# Patient Record
Sex: FEMALE | Race: WHITE | NOT HISPANIC OR LATINO | Employment: UNEMPLOYED | URBAN - METROPOLITAN AREA
[De-identification: names, ages, dates, MRNs, and addresses within clinical notes are randomized per-mention and may not be internally consistent; named-entity substitution may affect disease eponyms.]

---

## 2017-03-27 DIAGNOSIS — Z12.31 OTHER SCREENING MAMMOGRAM: ICD-10-CM

## 2017-06-13 ENCOUNTER — PATIENT OUTREACH (OUTPATIENT)
Dept: ADMINISTRATIVE | Facility: HOSPITAL | Age: 57
End: 2017-06-13

## 2017-06-26 ENCOUNTER — OFFICE VISIT (OUTPATIENT)
Dept: PODIATRY | Facility: CLINIC | Age: 57
End: 2017-06-26
Payer: COMMERCIAL

## 2017-06-26 ENCOUNTER — HOSPITAL ENCOUNTER (OUTPATIENT)
Dept: RADIOLOGY | Facility: HOSPITAL | Age: 57
Discharge: HOME | End: 2017-06-26
Attending: FAMILY MEDICINE
Payer: COMMERCIAL

## 2017-06-26 ENCOUNTER — LAB VISIT (OUTPATIENT)
Dept: LAB | Facility: HOSPITAL | Age: 57
End: 2017-06-26
Attending: FAMILY MEDICINE
Payer: COMMERCIAL

## 2017-06-26 ENCOUNTER — OFFICE VISIT (OUTPATIENT)
Dept: FAMILY MEDICINE | Facility: CLINIC | Age: 57
End: 2017-06-26
Attending: FAMILY MEDICINE
Payer: COMMERCIAL

## 2017-06-26 VITALS
WEIGHT: 147.38 LBS | SYSTOLIC BLOOD PRESSURE: 117 MMHG | BODY MASS INDEX: 24.55 KG/M2 | HEART RATE: 118 BPM | HEIGHT: 65 IN | DIASTOLIC BLOOD PRESSURE: 78 MMHG

## 2017-06-26 VITALS
WEIGHT: 147.38 LBS | HEART RATE: 76 BPM | SYSTOLIC BLOOD PRESSURE: 120 MMHG | HEIGHT: 65 IN | RESPIRATION RATE: 16 BRPM | DIASTOLIC BLOOD PRESSURE: 80 MMHG | BODY MASS INDEX: 24.55 KG/M2

## 2017-06-26 DIAGNOSIS — Z12.31 OTHER SCREENING MAMMOGRAM: ICD-10-CM

## 2017-06-26 DIAGNOSIS — M19.079 ARTHRITIS OF FOOT: ICD-10-CM

## 2017-06-26 DIAGNOSIS — M21.41 PES PLANUS OF BOTH FEET: ICD-10-CM

## 2017-06-26 DIAGNOSIS — M25.552 LEFT HIP PAIN: Primary | ICD-10-CM

## 2017-06-26 DIAGNOSIS — E55.9 VITAMIN D DEFICIENCY: ICD-10-CM

## 2017-06-26 DIAGNOSIS — Q79.60 EHLERS-DANLOS SYNDROME: ICD-10-CM

## 2017-06-26 DIAGNOSIS — N64.4 BREAST PAIN: ICD-10-CM

## 2017-06-26 DIAGNOSIS — M21.42 PES PLANUS OF BOTH FEET: ICD-10-CM

## 2017-06-26 DIAGNOSIS — M20.10 HALLUX ABDUCTO VALGUS, UNSPECIFIED LATERALITY: Primary | ICD-10-CM

## 2017-06-26 DIAGNOSIS — M70.62 TROCHANTERIC BURSITIS OF LEFT HIP: ICD-10-CM

## 2017-06-26 LAB — 25(OH)D3+25(OH)D2 SERPL-MCNC: 40 NG/ML

## 2017-06-26 PROCEDURE — 20600 DRAIN/INJ JOINT/BURSA W/O US: CPT | Mod: 50,S$GLB,, | Performed by: PODIATRIST

## 2017-06-26 PROCEDURE — 36415 COLL VENOUS BLD VENIPUNCTURE: CPT | Mod: PO

## 2017-06-26 PROCEDURE — 99214 OFFICE O/P EST MOD 30 MIN: CPT | Mod: S$GLB,,, | Performed by: FAMILY MEDICINE

## 2017-06-26 PROCEDURE — 82306 VITAMIN D 25 HYDROXY: CPT

## 2017-06-26 PROCEDURE — 76642 ULTRASOUND BREAST LIMITED: CPT | Mod: TC,LT

## 2017-06-26 PROCEDURE — 99999 PR PBB SHADOW E&M-EST. PATIENT-LVL III: CPT | Mod: PBBFAC,,, | Performed by: FAMILY MEDICINE

## 2017-06-26 PROCEDURE — 76642 ULTRASOUND BREAST LIMITED: CPT | Mod: 26,LT,, | Performed by: RADIOLOGY

## 2017-06-26 PROCEDURE — 99203 OFFICE O/P NEW LOW 30 MIN: CPT | Mod: 25,S$GLB,, | Performed by: PODIATRIST

## 2017-06-26 PROCEDURE — 77062 BREAST TOMOSYNTHESIS BI: CPT | Mod: TC

## 2017-06-26 PROCEDURE — 77062 BREAST TOMOSYNTHESIS BI: CPT | Mod: 26,,, | Performed by: RADIOLOGY

## 2017-06-26 PROCEDURE — 77066 DX MAMMO INCL CAD BI: CPT | Mod: 26,,, | Performed by: RADIOLOGY

## 2017-06-26 PROCEDURE — 99999 PR PBB SHADOW E&M-EST. PATIENT-LVL IV: CPT | Mod: PBBFAC,,, | Performed by: PODIATRIST

## 2017-06-26 RX ORDER — LIDOCAINE 50 MG/G
1 PATCH TOPICAL DAILY
Qty: 30 PATCH | Refills: 3 | Status: SHIPPED | OUTPATIENT
Start: 2017-06-26

## 2017-06-26 RX ORDER — TRIAMCINOLONE ACETONIDE 40 MG/ML
40 INJECTION, SUSPENSION INTRA-ARTICULAR; INTRAMUSCULAR ONCE
Status: COMPLETED | OUTPATIENT
Start: 2017-06-26 | End: 2017-06-26

## 2017-06-26 RX ADMIN — TRIAMCINOLONE ACETONIDE 40 MG: 40 INJECTION, SUSPENSION INTRA-ARTICULAR; INTRAMUSCULAR at 09:06

## 2017-06-26 NOTE — MEDICAL/APP STUDENT
Subjective:       Patient ID: Sophy Pascual is a 56 y.o. female.    Chief Complaint: Hip Pain    HPI  Patient is complaining of a 2 month history of left sided hip. She denies any inciting event. She states the pain is a 7/10, radiates into the left buttocks, and is aching in nature. She states the pain comes and goes and is worse when she gets up in the mornings, going down stairs, and sitting/ laying down for long periods of time. Pain is improved with stretching. 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 She has a history of bursitis in both hips but states the pain doesn't feel similar.     Patient getting mammogram today. UTD on colonoscopy/pap smear.  Last Tetanus in 2009. Has not been screened for Hep C     Review of Systems   Constitutional: Negative for fever and unexpected weight change.   Eyes: Negative for photophobia and visual disturbance.   Gastrointestinal: Positive for abdominal pain (chronic).   Genitourinary: Negative for difficulty urinating and urgency.   Musculoskeletal: Positive for arthralgias, back pain, myalgias and neck pain.   Neurological: Positive for headaches. Negative for dizziness and light-headedness.   Psychiatric/Behavioral: Positive for sleep disturbance.       Patient Active Problem List   Diagnosis    Chronic pain syndrome    John-Danlos syndrome    Chronic back pain    John-Danlos disease - Both Eyes    Atypical nevus    Osteoarthritis of hand    Facet joint disease of cervical region    Occipital neuralgia    Chronic migraine without aura, with intractable migraine, so stated, without mention of status migrainosus    Gastroesophageal reflux disease without esophagitis    Uterine prolapse Grade 2    Cystocele Grade 1    Rectocele Grade 2    Vaginal atrophy    Menopausal symptoms    Post-void dribbling    Constipation    BENJY (stress urinary incontinence, female)    Rectal bleeding      Current Outpatient Prescriptions on File Prior to Visit   Medication Sig Dispense Refill    baclofen (LIORESAL) 20 MG tablet       clonazePAM (KLONOPIN) 1 MG tablet Take 1 tablet (1 mg total) by mouth once daily. 30 tablet 4    estradiol 0.05 mg/24 hr td ptsw (VIVELLE-DOT) 0.05 mg/24 hr Place 1 patch onto the skin twice a week. 8 patch 11    fentaNYL (DURAGESIC) 75 mcg/hr Place 1 patch onto the skin every 72 hours.      ibuprofen (ADVIL,MOTRIN) 600 MG tablet Take 1 tablet (600 mg total) by mouth every 6 (six) hours as needed for Pain. 60 tablet 0    omeprazole (PRILOSEC) 40 MG capsule TAKE 1 CAPSULE (40 MG TOTAL) BY MOUTH EVERY MORNING. 90 capsule 1    oxycodone-acetaminophen (PERCOCET)  mg per tablet Take 1 tablet by mouth every 6 (six) hours as needed for Pain. 45 tablet 0    promethazine (PHENERGAN) 25 MG tablet   2    [DISCONTINUED] docusate sodium (COLACE) 100 MG capsule Take 1 capsule (100 mg total) by mouth 2 (two) times daily. 60 capsule 2    [DISCONTINUED] isometheptene-apap-dichloralphenazone [id number] (MIDRIN) -325 mg per capsule Take 1 capsule by mouth 4 (four) times daily as needed. 20 capsule 4    [DISCONTINUED] morphine (MSIR) 15 MG tablet Take 15 mg by mouth every 6 (six) hours as needed for Pain.      [DISCONTINUED] ondansetron (ZOFRAN-ODT) 8 MG TbDL Take 1 tablet (8 mg total) by mouth every 8 (eight) hours as needed. 30 tablet 0    [DISCONTINUED] topiramate (TOPAMAX) 200 MG Tab TAKE 1 TABLET BY MOUTH TWICE A DAY 60 tablet 5     No current facility-administered medications on file prior to visit.        Objective:       Vitals:    06/26/17 0935   BP: 120/80   Pulse: 76   Resp: 16       Physical Exam   Constitutional: She is oriented to person, place, and time. She appears well-developed and well-nourished.   HENT:   Head: Normocephalic and atraumatic.   Right Ear: External ear normal.   Left Ear: External ear normal.   Eyes: Conjunctivae are normal.   Neck: Normal  range of motion. Neck supple.   Cardiovascular: Normal rate and regular rhythm.    No murmur heard.  Pulmonary/Chest: Effort normal and breath sounds normal. She has no wheezes.   Abdominal: Soft. Bowel sounds are normal.   Musculoskeletal: She exhibits tenderness (tender over greater trochantor on left side, positve FADIR, negative VIKTORIYA, SLR).   Bilateral pes planus    Neurological: She is alert and oriented to person, place, and time.       Assessment:   Greater Trochanteric Bursitis   Vitamin D deficiency   Health Maintenance  Plan:   Greater Trochanteric Bursitis    Hip stretches given   Discussed the importance of being fitted for proper shoes for arch support   Given history of Vitamin D deficiency and risk of subluxation from John-Danlos; hip xray   Vitamin D deficiency    Check vitamin D levels   DEXA in the future   Health Maintenance   Hep C screening in future

## 2017-06-26 NOTE — PATIENT INSTRUCTIONS
Your test results will be communicated to you via : [facility], Telephone or Letter.   If you have not received your test results in one week, please contact the clinic at [phone].

## 2017-06-26 NOTE — PROGRESS NOTES
Subjective:       Patient ID: Sophy Pascual is a 56 y.o. female.    Chief Complaint: Hip Pain    Back Pain   This is a new problem. The problem occurs daily. The problem has been gradually worsening since onset. The pain is present in the gluteal. The quality of the pain is described as aching. The pain radiates to the left thigh. The pain is at a severity of 7/10. The pain is moderate. The pain is worse during the day. The symptoms are aggravated by lying down and sitting. Stiffness is present in the morning and at night. Associated symptoms include leg pain. Pertinent negatives include no abdominal pain, chest pain, dysuria, headaches or weakness. Risk factors include menopause. She has tried analgesics, NSAIDs, heat, home exercises, muscle relaxant and walking for the symptoms. The treatment provided mild relief.      Patient Active Problem List   Diagnosis    Chronic pain syndrome    John-Danlos syndrome    Chronic back pain    John-Danlos disease - Both Eyes    Atypical nevus    Osteoarthritis of hand    Facet joint disease of cervical region    Occipital neuralgia    Chronic migraine without aura, with intractable migraine, so stated, without mention of status migrainosus    Gastroesophageal reflux disease without esophagitis    Uterine prolapse Grade 2    Cystocele Grade 1    Rectocele Grade 2    Vaginal atrophy    Menopausal symptoms    Post-void dribbling    Constipation    BENJY (stress urinary incontinence, female)    Rectal bleeding       Current Outpatient Prescriptions:     baclofen (LIORESAL) 20 MG tablet, , Disp: , Rfl:     clonazePAM (KLONOPIN) 1 MG tablet, Take 1 tablet (1 mg total) by mouth once daily., Disp: 30 tablet, Rfl: 4    estradiol 0.05 mg/24 hr td ptsw (VIVELLE-DOT) 0.05 mg/24 hr, Place 1 patch onto the skin twice a week., Disp: 8 patch, Rfl: 11    fentaNYL (DURAGESIC) 75 mcg/hr, Place 1 patch onto the skin every 72 hours., Disp: , Rfl:     ibuprofen  (ADVIL,MOTRIN) 600 MG tablet, Take 1 tablet (600 mg total) by mouth every 6 (six) hours as needed for Pain., Disp: 60 tablet, Rfl: 0    omeprazole (PRILOSEC) 40 MG capsule, TAKE 1 CAPSULE (40 MG TOTAL) BY MOUTH EVERY MORNING., Disp: 90 capsule, Rfl: 1    oxycodone-acetaminophen (PERCOCET)  mg per tablet, Take 1 tablet by mouth every 6 (six) hours as needed for Pain., Disp: 45 tablet, Rfl: 0    promethazine (PHENERGAN) 25 MG tablet, , Disp: , Rfl: 2    The following portions of the patient's history were reviewed and updated as appropriate: allergies, past family history, past medical history, past social history and past surgical history.      Review of Systems   Constitutional: Negative for fatigue and unexpected weight change.   HENT: Negative for ear discharge, ear pain, hearing loss, tinnitus and voice change.    Respiratory: Negative for cough and shortness of breath.    Cardiovascular: Negative for chest pain, palpitations and leg swelling.   Gastrointestinal: Negative for abdominal pain, blood in stool, constipation, diarrhea, nausea and vomiting.   Genitourinary: Negative for difficulty urinating, dyspareunia, dysuria, frequency and hematuria.   Musculoskeletal: Positive for arthralgias and back pain. Negative for myalgias.   Skin: Negative for rash.   Neurological: Negative for dizziness, weakness, light-headedness and headaches.   Hematological: Does not bruise/bleed easily.   Psychiatric/Behavioral: Negative for dysphoric mood and sleep disturbance. The patient is not nervous/anxious.        Objective:      Physical Exam   Constitutional: She is oriented to person, place, and time. She appears well-developed and well-nourished.   HENT:   Head: Normocephalic and atraumatic.   Eyes: Conjunctivae are normal. No scleral icterus.   Neck: Neck supple.   Musculoskeletal:        Left hip: She exhibits decreased range of motion (with pain on abduction.), tenderness (over greater trochanter) and swelling.  "  Mildy positive heel strike; positive FADIR, negative VIKTORIYA tests.   Neurological: She is alert and oriented to person, place, and time.   Skin: Skin is warm and dry.   Psychiatric: She has a normal mood and affect.   Vitals reviewed.      Assessment:       1. Left hip pain    2. Trochanteric bursitis of left hip    3. John-Danlos syndrome    4. Vitamin D deficiency    5. Pes planus of both feet        Plan:       Discussed findings with patient.  Given history, we'll proceed with the following:    Orders Placed This Encounter    DXA Bone Density Spine And Hip    X-Ray Hip 2 View Left    Vitamin D     Also discussed proper footwear; patient is seeing podiatrist this week.  We will call the patient with results & make further recommendations at that time.      "This note will not be shared with the patient."  "

## 2017-06-26 NOTE — PATIENT INSTRUCTIONS
[employer] for bunion gel bunion guards, spacer as needed.    Supportive shoes with stiff or rocker sole, arch support, wide or soft toe box as needed (Altra**, LENARD RODRIGEZ, New Balance, Dansko, Radha, Naot, Propet, Vionoic, Volatile, fit flop sandals)      (Varsity sports, Phid[employer]ides, [place] running company, Masseys, Goodfeet, Cantilever, Feet First, Foot Solutions, Therapeutic shoes, SAS)    [email or web address]        Understanding Bunions    A bunion is a bony bump on the joint at the base of the big toe. A bunion changes the shape of the foot. It can also cause pain and problems using the foot.  A person with a bunion will have a bony bump at the base of the big toe. This is caused by misalignment of the toe joint, causing the bones to sit at an angle instead of straight. The big toe often turns in toward the second toe. In time, the big toe may start to overlap the second toe.    What causes bunions?  It is not clear what causes bunions, but many factors are likely involved. Bunions often run in families. They may be more common in people who have loose joints. Wearing tight shoes may also cause bunions.  Symptoms of bunions  At first, the problem may be painless. As symptoms develop, they may include:  · Foot pain or stiffness  · Swelling over the joint  · Skin irritation or thickened skin over the joint  Treatment for bunions  In most cases, your healthcare provider will try nonsurgical treatments first. Most of these treatments wont cure the bunion, but they can help relieve symptoms and keep the bunion from getting worse. These include:  · Wearing low-heeled shoes with a wide toe box. This can help relieve pressure on the bunion and make walking more comfortable.  · Using padding or special shoe inserts called orthotics. Inserts can be custom-made for your foot. They help support the foot and may change how the foot is aligned.  · Wearing a toe splint at night. This can help hold the toe in a  "straighter position.  · Putting an ice pack on a swollen joint. This can help reduce pain and swelling.  If the bunion causes severe pain or problems using the foot, your provider may recommend surgery. During surgery, excess bone may be removed and the joint is realigned.     When to call your healthcare provider  Call your healthcare provider right away if you have any of these:  · Fever of 100.4°F (38°C) or higher, or as directed  · Symptoms that dont get better, or get worse  · New symptoms   Date Last Reviewed: 3/10/2016  © 8050-9823 [employer]. [address] [place] [address]. All rights reserved. This information is not intended as a substitute for professional medical care. Always follow your healthcare professional's instructions.        Treating Bunions  Although a bunion wont go away, wearing shoes that fit properly will often relieve the pain. Padding and icing the bunion may also help. Bunions that remain painful may need surgery.     Heels: Heel height should be low. The back of the shoe should  your heel firmly so the shoe doesn't flop when you walk.         Toes: There should be 1/2" between your longest toe and the tip of the shoe. The shoe should be wide enough for you to wiggle your toes.    Shoes  To relieve a bunion, you dont have to buy shoes that are ugly or out of fashion. But follow these tips:  · Shop for shoes late in the day. This is when your feet are the largest.  · Have both feet measured often. Fit shoes to your larger foot.  · Look for shoes that have the same shape as your foot but are slightly wider across the toes.  · Choose low-heeled shoes.  · Always try shoes on. Stand up and walk around. If the shoes arent comfortable, dont buy them.  Ice massage  To help relieve a painful bunion, put an ice cube in a plastic bag. Rub the ice on the bunion for 5 minutes. Repeat 2 to 3 times a day.  Pads  You may want to put a pad over the bunion to cushion it. " You can buy bunion pads at most [employer]).  Surgery  Wearing wider shoes and padding the bunion may not relieve the pain. Your healthcare provider may then suggest surgery. During surgery, the bunion is shaved away and the bones are put back in a straight line.   Date Last Reviewed: 9/27/2015  © 2493-2781 [employer]. [address] [place] [address], [place] [address]. All rights reserved. This information is not intended as a substitute for professional medical care. Always follow your healthcare professional's instructions.

## 2017-06-27 ENCOUNTER — HOSPITAL ENCOUNTER (OUTPATIENT)
Dept: RADIOLOGY | Facility: HOSPITAL | Age: 57
Discharge: HOME | End: 2017-06-27
Attending: FAMILY MEDICINE
Payer: COMMERCIAL

## 2017-06-27 ENCOUNTER — HOSPITAL ENCOUNTER (OUTPATIENT)
Dept: RADIOLOGY | Facility: CLINIC | Age: 57
Discharge: HOME | End: 2017-06-27
Attending: FAMILY MEDICINE
Payer: COMMERCIAL

## 2017-06-27 ENCOUNTER — OFFICE VISIT (OUTPATIENT)
Dept: DERMATOLOGY | Facility: CLINIC | Age: 57
End: 2017-06-27
Payer: COMMERCIAL

## 2017-06-27 ENCOUNTER — PATIENT MESSAGE (OUTPATIENT)
Dept: FAMILY MEDICINE | Facility: CLINIC | Age: 57
End: 2017-06-27

## 2017-06-27 DIAGNOSIS — Z12.83 SCREENING EXAM FOR SKIN CANCER: ICD-10-CM

## 2017-06-27 DIAGNOSIS — M25.552 LEFT HIP PAIN: ICD-10-CM

## 2017-06-27 DIAGNOSIS — Q79.60 EHLERS-DANLOS SYNDROME: ICD-10-CM

## 2017-06-27 DIAGNOSIS — M20.10 HALLUX ABDUCTO VALGUS, UNSPECIFIED LATERALITY: ICD-10-CM

## 2017-06-27 DIAGNOSIS — M19.079 ARTHRITIS OF FOOT: ICD-10-CM

## 2017-06-27 DIAGNOSIS — D22.9 MULTIPLE BENIGN NEVI: Primary | ICD-10-CM

## 2017-06-27 DIAGNOSIS — E55.9 VITAMIN D DEFICIENCY: ICD-10-CM

## 2017-06-27 DIAGNOSIS — Z87.898 HISTORY OF ATYPICAL NEVUS: ICD-10-CM

## 2017-06-27 DIAGNOSIS — L82.1 SEBORRHEIC KERATOSIS: ICD-10-CM

## 2017-06-27 PROCEDURE — 99203 OFFICE O/P NEW LOW 30 MIN: CPT | Mod: S$GLB,,, | Performed by: DERMATOLOGY

## 2017-06-27 PROCEDURE — 73630 X-RAY EXAM OF FOOT: CPT | Mod: 26,50,, | Performed by: RADIOLOGY

## 2017-06-27 PROCEDURE — 77080 DXA BONE DENSITY AXIAL: CPT | Mod: 26,,, | Performed by: INTERNAL MEDICINE

## 2017-06-27 PROCEDURE — 73630 X-RAY EXAM OF FOOT: CPT | Mod: 50,TC

## 2017-06-27 PROCEDURE — 73502 X-RAY EXAM HIP UNI 2-3 VIEWS: CPT | Mod: 26,LT,, | Performed by: RADIOLOGY

## 2017-06-27 PROCEDURE — 99999 PR PBB SHADOW E&M-EST. PATIENT-LVL II: CPT | Mod: PBBFAC,,, | Performed by: DERMATOLOGY

## 2017-06-27 PROCEDURE — 73502 X-RAY EXAM HIP UNI 2-3 VIEWS: CPT | Mod: TC,LT

## 2017-06-27 PROCEDURE — 77080 DXA BONE DENSITY AXIAL: CPT | Mod: TC

## 2017-06-27 NOTE — LETTER
June 27, 2017      Meet Callaway Jr., MD  [address]  [address]  [facility] [address]           [facility] - Dermatology  [address]  [place] [address]  Phone: [phone]  Fax: [phone]          Patient: Sophy Pascual   MR Number: [id number]   YOB: 1960   Date of Visit: 6/27/2017       Dear Dr. Meet Callaway Jr.:    Thank you for referring Sophy Pascual to me for evaluation. Attached you will find relevant portions of my assessment and plan of care.    If you have questions, please do not hesitate to call me. I look forward to following Sophy Pascual along with you.    Sincerely,    Lauren Little MD    Enclosure  CC:  No Recipients    If you would like to receive this communication electronically, please contact [email or web address] or [phone] to request more information on [employer] access.    For providers and/or their staff who would like to refer a patient to [facility], please contact us through our one-stop-shop provider referral line, [facility] [place], at [phone].    If you feel you have received this communication in error or would no longer like to receive these types of communications, please e-mail [email or web address]

## 2017-06-27 NOTE — PROGRESS NOTES
Subjective:       Patient ID:  Sophy Pascual is a 56 y.o. female who presents for   Chief Complaint   Patient presents with    Skin Check     TBSE     History of Present Illness: The patient presents for follow up of skin check. Last seen 10/14/13 by [facility]. x2 lesions biopsied- Right upper back with negative margins and left thigh. Lesion to Left thigh excised per Dr. Arora 11/26/13.   Here for TBSE.   H/o John- Danlos syndrome with skin fragility and significant h/o dysplastic nevi.   No h/o NMSC or Mm. Pt's father did have h/o skin cancer  Hasn't noted any new concerning moles.        Review of Systems   Skin: Positive for daily sunscreen use and activity-related sunscreen use. Negative for recent sunburn.   Hematologic/Lymphatic: Bruises/bleeds easily (John- Danols syndrome).        Objective:    Physical Exam   Constitutional: She appears well-developed and well-nourished. No distress.   Neurological: She is alert and oriented to person, place, and time. She is not disoriented.   Psychiatric: She has a normal mood and affect.   Skin:   Areas Examined (abnormalities noted in diagram):   Scalp / Hair Palpated and Inspected  Head / Face Inspection Performed  Neck Inspection Performed  Chest / Axilla Inspection Performed  Abdomen Inspection Performed  Genitals / Buttocks / Groin Inspection Performed  Back Inspection Performed  RUE Inspected  LUE Inspection Performed  RLE Inspected  LLE Inspection Performed  Nails and Digits Inspection Performed                       Diagram Legend     Erythematous scaling macule/papule c/w actinic keratosis       Vascular papule c/w angioma      Pigmented verrucoid papule/plaque c/w seborrheic keratosis      Yellow umbilicated papule c/w sebaceous hyperplasia      Irregularly shaped tan macule c/w lentigo     1-2 mm smooth white papules consistent with Milia      Movable subcutaneous cyst with punctum c/w epidermal inclusion cyst      Subcutaneous movable cyst c/w  pilar cyst      Firm pink to brown papule c/w dermatofibroma      Pedunculated fleshy papule(s) c/w skin tag(s)      Evenly pigmented macule c/w junctional nevus     Mildly variegated pigmented, slightly irregular-bordered macule c/w mildly atypical nevus      Flesh colored to evenly pigmented papule c/w intradermal nevus       Pink pearly papule/plaque c/w basal cell carcinoma      Erythematous hyperkeratotic cursted plaque c/w SCC      Surgical scar with no sign of skin cancer recurrence      Open and closed comedones      Inflammatory papules and pustules      Verrucoid papule consistent consistent with wart     Erythematous eczematous patches and plaques     Dystrophic onycholytic nail with subungual debris c/w onychomycosis     Umbilicated papule    Erythematous-base heme-crusted tan verrucoid plaque consistent with inflamed seborrheic keratosis     Erythematous Silvery Scaling Plaque c/w Psoriasis     See annotation      Assessment / Plan:        Multiple benign nevi  total body skin examination performed today including at least 12 points as noted in physical examination. No lesions suspicious for malignancy noted.  Reassurance provided.  Instructed patient to observe lesion(s) for changes and follow up in clinic if changes are noted. Discussed ABCDE's of moles and brochure provided.      Seborrheic keratosis  These are benign inherited growths without a malignant potential. Reassurance given to patient. No treatment is necessary.     Screening exam for skin cancer with History of atypical nevus  Area of previous atypical nevus examined. Site well healed with no signs of recurrence.    Total body skin examination performed today including at least 12 points as noted in physical examination. No lesions suspicious for malignancy noted.               Return in about 1 year (around 6/27/2018).

## 2017-07-04 NOTE — PROGRESS NOTES
Subjective:      Patient ID: Sophy Pascual is a 56 y.o. female.    Chief Complaint: Bunions (bilateral/ Dr. Callaway 6/26/17)    Sophy is a 56 y.o. female who presents to the podiatry clinic  with complaint of  bilateral foot pain. Onset of the symptoms was several years ago. Precipitating event: none known and increased severity of bunions. Current symptoms include: ability to bear weight, but with some pain, worsening symptoms after a period of activity and pain at bunions due to pressure from some closed toe shoes. Aggravating factors: walking and shoes. Symptoms have waxed and waned but are worse overall. Patient has had no prior foot problems. Evaluation to date: none. Treatment to date: avoidance of offending activity, ice, OTC analgesics which are somewhat effective and rest. Patients rates pain 8/10 on pain scale.        Review of Systems   Constitution: Negative for chills, fever, weakness, malaise/fatigue and night sweats.   Cardiovascular: Negative for chest pain, leg swelling, orthopnea and palpitations.   Respiratory: Negative for cough, shortness of breath and wheezing.    Skin: Negative for color change, itching, poor wound healing and rash.   Musculoskeletal: Positive for arthritis and joint pain. Negative for gout, joint swelling, muscle weakness and myalgias.   Gastrointestinal: Negative for abdominal pain, constipation and nausea.   Neurological: Negative for disturbances in coordination, dizziness, focal weakness, numbness and tremors.           Objective:      Physical Exam   Constitutional: She is oriented to person, place, and time. Vital signs are normal. She appears well-developed. She is cooperative. No distress.   Cardiovascular: Intact distal pulses.    Pulses:       Dorsalis pedis pulses are 2+ on the right side, and 2+ on the left side.        Posterior tibial pulses are 2+ on the right side, and 2+ on the left side.   Musculoskeletal:        Right ankle: Normal.        Left  ankle: Normal.        Right foot: There is tenderness and deformity. There is normal range of motion, no bony tenderness, normal capillary refill and no crepitus.        Left foot: There is tenderness and deformity. There is normal range of motion, no bony tenderness, normal capillary refill and no crepitus.   Painful medial 1st MTPJ exostosis, bilateral. Lateral deviation of hallux, non trackbound. No pain w/ ROM to 1st or 2nd MTPJs. Moderate first ray/TMT joint hypermobility with noted sub second MT head callus. No lesser toe deformities or pain.     Moderate hindfoot over pronation in stance.          Neurological: She is alert and oriented to person, place, and time. She has normal strength. No sensory deficit. She exhibits normal muscle tone. Gait normal.   Reflex Scores:       Achilles reflexes are 2+ on the right side and 2+ on the left side.  Negative Tinels sign and Rudi's click, bilat.   Skin: Skin is intact. No ecchymosis and no lesion noted. No erythema. Nails show no clubbing.   No foot and ankle edema.  No open wounds.               Assessment:       Encounter Diagnoses   Name Primary?    Hallux abducto valgus, unspecified laterality Yes    Arthritis of foot     John-Danlos syndrome          Plan:       Sophy was seen today for bunions.    Diagnoses and all orders for this visit:    Hallux abducto valgus, unspecified laterality  -     X-Ray Foot Complete Bilateral; Future  -     lidocaine (LIDODERM) 5 %; Place 1 patch onto the skin once daily. Remove & Discard patch within 12 hours or as directed by MD    Arthritis of foot  -     X-Ray Foot Complete Bilateral; Future  -     lidocaine (LIDODERM) 5 %; Place 1 patch onto the skin once daily. Remove & Discard patch within 12 hours or as directed by MD    John-Danlos syndrome  -     X-Ray Foot Complete Bilateral; Future    Other orders  -     triamcinolone acetonide injection 40 mg; Inject 1 mL (40 mg total) into the articular space once.      I  counseled the patient on her conditions, their implications and medical management.    Discussed importance of supportive shoes with accommodative toe box to reduce pressure and irritation to forefoot    Reviewed bunion padding, splinting options and arch supports to stabilize medial column. Will consider custom orthotics as needed.    RICE, NSAID'S, lidoderm patch as needed. Side effects of medication(s) were discussed in detail and patient voiced understanding. Patient will call back for any issues or complications.     X rays to evaluate severity of bunions, presence of arthritis/subluxation.    A steroid injection was performed at right and left 1st MTPJ using 1% plain Lidocaine and 20 mg of Kenalog at each joint. This was well tolerated.     She will f/u prn.

## 2017-07-08 ENCOUNTER — PATIENT MESSAGE (OUTPATIENT)
Dept: FAMILY MEDICINE | Facility: CLINIC | Age: 57
End: 2017-07-08

## 2017-07-08 RX ORDER — MULTIVITAMIN
1 TABLET ORAL 2 TIMES DAILY WITH MEALS
Refills: 0 | COMMUNITY
Start: 2017-07-08

## 2017-09-13 ENCOUNTER — TELEPHONE (OUTPATIENT)
Dept: OPHTHALMOLOGY | Facility: CLINIC | Age: 57
End: 2017-09-13

## 2017-09-14 ENCOUNTER — OFFICE VISIT (OUTPATIENT)
Dept: OPHTHALMOLOGY | Facility: CLINIC | Age: 57
End: 2017-09-14
Payer: COMMERCIAL

## 2017-09-14 DIAGNOSIS — H43.811 SYMPTOMATIC POSTERIOR VITREOUS DETACHMENT OF RIGHT EYE: ICD-10-CM

## 2017-09-14 PROCEDURE — 92014 COMPRE OPH EXAM EST PT 1/>: CPT | Mod: S$GLB,,, | Performed by: OPHTHALMOLOGY

## 2017-09-14 PROCEDURE — 99999 PR PBB SHADOW E&M-EST. PATIENT-LVL III: CPT | Mod: PBBFAC,,, | Performed by: OPHTHALMOLOGY

## 2017-09-14 RX ORDER — OXYCODONE HYDROCHLORIDE 10 MG/1
10 TABLET ORAL EVERY 6 HOURS
Refills: 0 | COMMUNITY
Start: 2017-06-23

## 2017-09-14 NOTE — PROGRESS NOTES
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  Patient states OD seeing a large floater and flashing of lights x 2 days   which the floater has grown in size.  No pain, but OD feels irritated and dry.  No eye drops.    I have personally interviewed the patient, reviewed the history and   examined the patient and agree with the technician's exam.    Last edited by Peter Rivera MD on 9/14/2017 10:34 AM. (History)            Assessment /Plan     For exam results, see Encounter Report.    Symptomatic posterior vitreous detachment of right eye      I found no peripheral retinal traction or tears.  Warning signs of retinal detachment:  Sudden increase in flashes of light.  Sudden increase in number of floaters.  Blockage of part of vision.  Immediate evaluation if above occur.  Repeat exam in one month.

## 2017-09-14 NOTE — PATIENT INSTRUCTIONS
Warning signs of retinal detachment:  Sudden increase in flashes of light.  Sudden increase in number of floaters.  Blockage of part of vision.  Immediate evaluation if above occur.  Repeat exam in one month.

## 2017-09-15 ENCOUNTER — OFFICE VISIT (OUTPATIENT)
Dept: PODIATRY | Facility: CLINIC | Age: 57
End: 2017-09-15
Payer: COMMERCIAL

## 2017-09-15 VITALS
SYSTOLIC BLOOD PRESSURE: 147 MMHG | HEART RATE: 117 BPM | DIASTOLIC BLOOD PRESSURE: 88 MMHG | BODY MASS INDEX: 23.77 KG/M2 | WEIGHT: 142.69 LBS | HEIGHT: 65 IN

## 2017-09-15 DIAGNOSIS — Q79.60 EHLERS-DANLOS SYNDROME: ICD-10-CM

## 2017-09-15 DIAGNOSIS — M20.10 HALLUX ABDUCTO VALGUS, UNSPECIFIED LATERALITY: Primary | ICD-10-CM

## 2017-09-15 DIAGNOSIS — M19.079 ARTHRITIS OF FOOT: ICD-10-CM

## 2017-09-15 PROCEDURE — 99999 PR PBB SHADOW E&M-EST. PATIENT-LVL III: CPT | Mod: PBBFAC,,, | Performed by: PODIATRIST

## 2017-09-15 PROCEDURE — 20600 DRAIN/INJ JOINT/BURSA W/O US: CPT | Mod: 50,S$GLB,, | Performed by: PODIATRIST

## 2017-09-15 PROCEDURE — 99499 UNLISTED E&M SERVICE: CPT | Mod: S$GLB,,, | Performed by: PODIATRIST

## 2017-09-15 RX ORDER — TRIAMCINOLONE ACETONIDE 40 MG/ML
40 INJECTION, SUSPENSION INTRA-ARTICULAR; INTRAMUSCULAR ONCE
Status: COMPLETED | OUTPATIENT
Start: 2017-09-15 | End: 2017-09-15

## 2017-09-15 RX ADMIN — TRIAMCINOLONE ACETONIDE 40 MG: 40 INJECTION, SUSPENSION INTRA-ARTICULAR; INTRAMUSCULAR at 12:09

## 2017-09-15 NOTE — PROGRESS NOTES
Subjective:      Patient ID: Sophy Pascual is a 57 y.o. female.    Chief Complaint: Foot Problem (PCP 6/26/17 Dr. Callaway); Foot Pain (ask for injection); and Follow-up (bilateral/ right is more worse)    Sophy is a 57 y.o. female who presents to the podiatry clinic  with complaint of  bilateral foot pain. Onset of the symptoms was several years ago. Precipitating event: none known and increased severity of bunions. Current symptoms include: ability to bear weight, but with some pain, worsening symptoms after a period of activity and pain at bunions due to pressure from some closed toe shoes. Aggravating factors: walking and shoes. Symptoms have waxed and waned but are worse overall. Patient has had no prior foot problems. Evaluation to date: none. Treatment to date: avoidance of offending activity, ice, OTC analgesics which are somewhat effective and rest. Patients rates pain 8/10 on pain scale.    9/15/17: f/u for painful bunions. Improved greatly improved with steroid injection 3 months ago and now slowly returning, especially at left foot. Traveling to [place] next week and expecting to increase walking with tours. Requests injection today.    Review of Systems   Constitution: Negative for chills, fever, weakness, malaise/fatigue and night sweats.   Cardiovascular: Negative for chest pain, leg swelling, orthopnea and palpitations.   Respiratory: Negative for cough, shortness of breath and wheezing.    Skin: Negative for color change, itching, poor wound healing and rash.   Musculoskeletal: Positive for arthritis and joint pain. Negative for gout, joint swelling, muscle weakness and myalgias.   Gastrointestinal: Negative for abdominal pain, constipation and nausea.   Neurological: Negative for disturbances in coordination, dizziness, focal weakness, numbness and tremors.           Objective:      Physical Exam   Constitutional: She is oriented to person, place, and time. Vital signs are normal. She  appears well-developed. She is cooperative. No distress.   Cardiovascular: Intact distal pulses.    Pulses:       Dorsalis pedis pulses are 2+ on the right side, and 2+ on the left side.        Posterior tibial pulses are 2+ on the right side, and 2+ on the left side.   Musculoskeletal:        Right ankle: Normal.        Left ankle: Normal.        Right foot: There is tenderness and deformity. There is normal range of motion, no bony tenderness, normal capillary refill and no crepitus.        Left foot: There is tenderness and deformity. There is normal range of motion, no bony tenderness, normal capillary refill and no crepitus.   Painful medial 1st MTPJ exostosis, bilateral. Lateral deviation of hallux, non trackbound. No pain w/ ROM to 1st or 2nd MTPJs. Moderate first ray/TMT joint hypermobility with noted sub second MT head callus. No lesser toe deformities or pain.     Moderate hindfoot over pronation in stance.          Neurological: She is alert and oriented to person, place, and time. She has normal strength. No sensory deficit. She exhibits normal muscle tone. Gait normal.   Reflex Scores:       Achilles reflexes are 2+ on the right side and 2+ on the left side.  Negative Tinels sign and Rudi's click, bilat.   Skin: Skin is intact. No ecchymosis and no lesion noted. No erythema. Nails show no clubbing.   No foot and ankle edema.  No open wounds.               Assessment:       Encounter Diagnoses   Name Primary?    Hallux abducto valgus, unspecified laterality Yes    John-Danlos syndrome     Arthritis of foot          Plan:       Sophy was seen today for foot problem, foot pain and follow-up.    Diagnoses and all orders for this visit:    Hallux abducto valgus, unspecified laterality  -     triamcinolone acetonide injection 40 mg; Inject 1 mL (40 mg total) into the articular space once.    John-Danlos syndrome    Arthritis of foot      I counseled the patient on her conditions, their implications  and medical management.    Discussed importance of supportive shoes with accommodative toe box to reduce pressure and irritation to forefoot    Reviewed bunion padding, splinting options and arch supports to stabilize medial column. Will consider custom orthotics as needed.    RICE, NSAID'S, lidoderm patch as needed. Side effects of medication(s) were discussed in detail and patient voiced understanding. Patient will call back for any issues or complications.     X rays to evaluate severity of bunions, presence of arthritis/subluxation.    A steroid injection was performed at right and left 1st MTPJ using 1% plain Lidocaine and 20 mg of Kenalog at each joint. This was well tolerated.     She will f/u prn.

## 2017-10-06 ENCOUNTER — TELEPHONE (OUTPATIENT)
Dept: UROGYNECOLOGY | Facility: CLINIC | Age: 57
End: 2017-10-06

## 2017-10-06 NOTE — TELEPHONE ENCOUNTER
----- Message from Jennifer Dan sent at 10/6/2017  8:13 AM CDT -----  Contact: SHAYNA / Lena [phone]  ..Refill request.  estradiol 0.05 mg/24 hr td ptsw ([facility]) 0.05 mg/24 hr      SHAYNA  [phone] (phone)  [phone] (fax)

## 2017-10-06 NOTE — TELEPHONE ENCOUNTER
Informed Tod with [facility] pharmacy that pt needed to schedule an f/u apt in order to receive an refill. I spoke with pt and she stated she lives in [place] and won't be able to schedule an apt.

## 2018-05-18 DIAGNOSIS — Z11.59 NEED FOR HEPATITIS C SCREENING TEST: ICD-10-CM
